# Patient Record
Sex: FEMALE | Race: WHITE | Employment: FULL TIME | URBAN - METROPOLITAN AREA
[De-identification: names, ages, dates, MRNs, and addresses within clinical notes are randomized per-mention and may not be internally consistent; named-entity substitution may affect disease eponyms.]

---

## 2024-02-05 RX ORDER — NADOLOL 20 MG/1
20 TABLET ORAL DAILY
COMMUNITY

## 2024-02-05 RX ORDER — OXYCODONE HYDROCHLORIDE AND ACETAMINOPHEN 5; 325 MG/1; MG/1
1 TABLET ORAL EVERY 4 HOURS PRN
Status: ON HOLD | COMMUNITY
End: 2024-02-22 | Stop reason: HOSPADM

## 2024-02-05 NOTE — PROGRESS NOTES
Follow Up Prior to Surgery    DOS:   :1955      History and Physical:  Pt will see Luciano Mooney on   [phone]    2/15/24 UPDATE: See pre-op H&P/LABS in CareEverywhere

## 2024-02-05 NOTE — PROGRESS NOTES
[facility] Pre-Admission Testing Electronic Communication Worksheet for OR/ENDO Procedures        Patient: Stephanie Whitney    DOS: 2/22    Arrival Time: 7    Surgery Time:830    Meds to Bed:  [x] YES    []  NO    Transportation Confirmed: [x] YES    []  NO    History and Physical:seee epic follow up note    Additional Clearance(Cardiac, Pulmonary, etc):  [] YES    [x]  NO    Pre-Admission Testing Visit:  [] YES    [x]  NO If no, do labs/testing need to be done DOS?  [] YES    [x]  NO    Medication Reconciliation Complete:  [x] YES    []  NO        Additional Notes:                Interview Complete: [x] YES    []  NO          Carolyne Pardo RN  12:53 PM

## 2024-02-05 NOTE — PROGRESS NOTES
[facility] PRE-OPERATIVE INSTRUCTIONS    Day of Procedure:  2/22              Arrival time: 630               Surgery time:830    Take the following medications with a sip of water:  Follow your MD/Surgeons pre-procedure instructions regarding your medications     Do not eat or drink anything after 12:00 midnight prior to your surgery.  This includes water chewing gum, mints and ice chips.   You may brush your teeth and gargle the morning of your surgery, but do not swallow the water     Please see your family doctor/pediatrician for a history and physical and/or concerning medications.   Bring any test results/reports from your physicians office.   If you are under the care of a heart doctor or specialist doctor, please be aware that you may be asked to them for clearance    You may be asked to stop blood thinners such as Coumadin, Plavix, Fragmin, Lovenox, etc., or any anti-inflammatories such as:  Aspirin, Ibuprofen, Advil, Naproxen prior to your surgery.    We also ask that you stop any OTC medications such as fish oil, vitamin E, glucosamine, garlic, Multivitamins, COQ 10, etc.    We ask that you do not smoke 24 hours prior to surgery  We ask that you do not  drink any alcoholic beverages 24 hours prior to surgery     You must make arrangements for a responsible adult to take you home after your surgery.    For your safety you will not be allowed to leave alone or drive yourself home.  Your surgery will be cancelled if you do not have a ride home.     Also for your safety, it is strongly suggested that someone stay with you the first 24 hours after your surgery.     A parent or legal guardian must accompany a child scheduled for surgery and plan to stay at the hospital until the child is discharged.    Please do not bring other children with you.    For your comfort, please wear simple loose fitting clothing to the hospital.  Please do not bring valuables.    Do not wear any make-up or nail polish

## 2024-02-21 ENCOUNTER — PREP FOR PROCEDURE (OUTPATIENT)
Dept: SURGERY | Age: 69
End: 2024-02-21

## 2024-02-21 ENCOUNTER — ANESTHESIA EVENT (OUTPATIENT)
Dept: OPERATING ROOM | Age: 69
End: 2024-02-21
Payer: MEDICARE

## 2024-02-21 RX ORDER — SODIUM CHLORIDE 0.9 % (FLUSH) 0.9 %
5-40 SYRINGE (ML) INJECTION PRN
Status: CANCELLED | OUTPATIENT
Start: 2024-02-21

## 2024-02-21 RX ORDER — SODIUM CHLORIDE 0.9 % (FLUSH) 0.9 %
5-40 SYRINGE (ML) INJECTION EVERY 12 HOURS SCHEDULED
Status: CANCELLED | OUTPATIENT
Start: 2024-02-21

## 2024-02-21 RX ORDER — SODIUM CHLORIDE 9 MG/ML
INJECTION, SOLUTION INTRAVENOUS PRN
Status: CANCELLED | OUTPATIENT
Start: 2024-02-21

## 2024-02-22 ENCOUNTER — APPOINTMENT (OUTPATIENT)
Dept: GENERAL RADIOLOGY | Age: 69
End: 2024-02-22
Payer: MEDICARE

## 2024-02-22 ENCOUNTER — ANESTHESIA (OUTPATIENT)
Dept: OPERATING ROOM | Age: 69
End: 2024-02-22
Payer: MEDICARE

## 2024-02-22 ENCOUNTER — HOSPITAL ENCOUNTER (OUTPATIENT)
Age: 69
Setting detail: OBSERVATION
Discharge: HOME | End: 2024-02-23
Attending: SURGERY | Admitting: SURGERY
Payer: MEDICARE

## 2024-02-22 ENCOUNTER — HOSPITAL ENCOUNTER (OUTPATIENT)
Age: 69
Setting detail: OUTPATIENT SURGERY
Discharge: HOME | End: 2024-02-22
Attending: SURGERY | Admitting: SURGERY
Payer: MEDICARE

## 2024-02-22 VITALS
HEART RATE: 70 BPM | OXYGEN SATURATION: 98 % | HEIGHT: 64 IN | RESPIRATION RATE: 18 BRPM | TEMPERATURE: 97 F | DIASTOLIC BLOOD PRESSURE: 52 MMHG | WEIGHT: 118.72 LBS | BODY MASS INDEX: 20.27 KG/M2 | SYSTOLIC BLOOD PRESSURE: 120 MMHG

## 2024-02-22 DIAGNOSIS — I95.81 HYPOTENSION AFTER PROCEDURE: Primary | ICD-10-CM

## 2024-02-22 DIAGNOSIS — Z85.3 HISTORY OF BREAST CANCER: ICD-10-CM

## 2024-02-22 DIAGNOSIS — Z98.890 POSTOPERATIVE NAUSEA AND VOMITING: ICD-10-CM

## 2024-02-22 DIAGNOSIS — N64.4 MASTODYNIA: ICD-10-CM

## 2024-02-22 DIAGNOSIS — R55 SYNCOPE AND COLLAPSE: ICD-10-CM

## 2024-02-22 DIAGNOSIS — N60.11 FIBROCYSTIC DISEASE OF RIGHT BREAST: ICD-10-CM

## 2024-02-22 DIAGNOSIS — R11.2 POSTOPERATIVE NAUSEA AND VOMITING: ICD-10-CM

## 2024-02-22 PROBLEM — Z90.10: Status: ACTIVE | Noted: 2024-02-22

## 2024-02-22 LAB
ABO + RH BLD: NORMAL
ALBUMIN SERPL-MCNC: 4.2 G/DL (ref 3.4–5)
ALBUMIN SERPL-MCNC: 4.4 G/DL (ref 3.4–5)
ALBUMIN/GLOB SERPL: 1.5 {RATIO} (ref 1.1–2.2)
ALBUMIN/GLOB SERPL: 1.9 {RATIO} (ref 1.1–2.2)
ALP SERPL-CCNC: 55 U/L (ref 40–129)
ALP SERPL-CCNC: 73 U/L (ref 40–129)
ALT SERPL-CCNC: 18 U/L (ref 10–40)
ALT SERPL-CCNC: 22 U/L (ref 10–40)
ANION GAP SERPL CALCULATED.3IONS-SCNC: 6 MMOL/L (ref 3–16)
ANION GAP SERPL CALCULATED.3IONS-SCNC: 9 MMOL/L (ref 3–16)
AST SERPL-CCNC: 29 U/L (ref 15–37)
AST SERPL-CCNC: 33 U/L (ref 15–37)
BACTERIA URNS QL MICRO: ABNORMAL /HPF
BASOPHILS # BLD: 0 K/UL (ref 0–0.2)
BASOPHILS NFR BLD: 0.3 %
BILIRUB SERPL-MCNC: 0.3 MG/DL (ref 0–1)
BILIRUB SERPL-MCNC: 0.4 MG/DL (ref 0–1)
BILIRUB UR QL STRIP.AUTO: NEGATIVE
BLD GP AB SCN SERPL QL: NORMAL
BUN SERPL-MCNC: 17 MG/DL (ref 7–20)
BUN SERPL-MCNC: 18 MG/DL (ref 7–20)
CALCIUM SERPL-MCNC: 8.8 MG/DL (ref 8.3–10.6)
CALCIUM SERPL-MCNC: 9.7 MG/DL (ref 8.3–10.6)
CHLORIDE SERPL-SCNC: 103 MMOL/L (ref 99–110)
CHLORIDE SERPL-SCNC: 105 MMOL/L (ref 99–110)
CLARITY UR: CLEAR
CO2 SERPL-SCNC: 25 MMOL/L (ref 21–32)
CO2 SERPL-SCNC: 29 MMOL/L (ref 21–32)
COLOR UR: YELLOW
CREAT SERPL-MCNC: 0.6 MG/DL (ref 0.6–1.2)
CREAT SERPL-MCNC: <0.5 MG/DL (ref 0.6–1.2)
DEPRECATED RDW RBC AUTO: 13.1 % (ref 12.4–15.4)
DEPRECATED RDW RBC AUTO: 13.4 % (ref 12.4–15.4)
EOSINOPHIL # BLD: 0 K/UL (ref 0–0.6)
EOSINOPHIL NFR BLD: 0 %
EPI CELLS #/AREA URNS AUTO: 1 /HPF (ref 0–5)
GFR SERPLBLD CREATININE-BSD FMLA CKD-EPI: >60 ML/MIN/{1.73_M2}
GFR SERPLBLD CREATININE-BSD FMLA CKD-EPI: >60 ML/MIN/{1.73_M2}
GLUCOSE SERPL-MCNC: 122 MG/DL (ref 70–99)
GLUCOSE SERPL-MCNC: 81 MG/DL (ref 70–99)
GLUCOSE UR STRIP.AUTO-MCNC: NEGATIVE MG/DL
HCT VFR BLD AUTO: 29.3 % (ref 36–48)
HCT VFR BLD AUTO: 36.8 % (ref 36–48)
HGB BLD-MCNC: 10.1 G/DL (ref 12–16)
HGB BLD-MCNC: 12.3 G/DL (ref 12–16)
HGB UR QL STRIP.AUTO: NEGATIVE
HYALINE CASTS #/AREA URNS AUTO: 7 /LPF (ref 0–8)
KETONES UR STRIP.AUTO-MCNC: NEGATIVE MG/DL
LEUKOCYTE ESTERASE UR QL STRIP.AUTO: ABNORMAL
LIPASE SERPL-CCNC: 17 U/L (ref 13–60)
LYMPHOCYTES # BLD: 0.7 K/UL (ref 1–5.1)
LYMPHOCYTES NFR BLD: 6.7 %
MCH RBC QN AUTO: 28.8 PG (ref 26–34)
MCH RBC QN AUTO: 29.7 PG (ref 26–34)
MCHC RBC AUTO-ENTMCNC: 33.4 G/DL (ref 31–36)
MCHC RBC AUTO-ENTMCNC: 34.6 G/DL (ref 31–36)
MCV RBC AUTO: 85.8 FL (ref 80–100)
MCV RBC AUTO: 86.1 FL (ref 80–100)
MONOCYTES # BLD: 1.3 K/UL (ref 0–1.3)
MONOCYTES NFR BLD: 11.7 %
NEUTROPHILS # BLD: 8.8 K/UL (ref 1.7–7.7)
NEUTROPHILS NFR BLD: 81.3 %
NITRITE UR QL STRIP.AUTO: NEGATIVE
PH UR STRIP.AUTO: 6 [PH] (ref 5–8)
PLATELET # BLD AUTO: 225 K/UL (ref 135–450)
PLATELET # BLD AUTO: 284 K/UL (ref 135–450)
PMV BLD AUTO: 6.6 FL (ref 5–10.5)
PMV BLD AUTO: 6.6 FL (ref 5–10.5)
POTASSIUM SERPL-SCNC: 4.2 MMOL/L (ref 3.5–5.1)
POTASSIUM SERPL-SCNC: 4.3 MMOL/L (ref 3.5–5.1)
PROT SERPL-MCNC: 6.4 G/DL (ref 6.4–8.2)
PROT SERPL-MCNC: 7.4 G/DL (ref 6.4–8.2)
PROT UR STRIP.AUTO-MCNC: NEGATIVE MG/DL
RBC # BLD AUTO: 3.42 M/UL (ref 4–5.2)
RBC # BLD AUTO: 4.27 M/UL (ref 4–5.2)
RBC CLUMPS #/AREA URNS AUTO: 0 /HPF (ref 0–4)
SODIUM SERPL-SCNC: 137 MMOL/L (ref 136–145)
SODIUM SERPL-SCNC: 140 MMOL/L (ref 136–145)
SP GR UR STRIP.AUTO: 1.01 (ref 1–1.03)
TROPONIN, HIGH SENSITIVITY: 14 NG/L (ref 0–14)
TROPONIN, HIGH SENSITIVITY: 7 NG/L (ref 0–14)
UA COMPLETE W REFLEX CULTURE PNL UR: YES
UA DIPSTICK W REFLEX MICRO PNL UR: YES
URN SPEC COLLECT METH UR: ABNORMAL
UROBILINOGEN UR STRIP-ACNC: 0.2 E.U./DL
WBC # BLD AUTO: 10.8 K/UL (ref 4–11)
WBC # BLD AUTO: 4.1 K/UL (ref 4–11)
WBC #/AREA URNS AUTO: 11 /HPF (ref 0–5)

## 2024-02-22 PROCEDURE — 2709999900 HC NON-CHARGEABLE SUPPLY: Performed by: SURGERY

## 2024-02-22 PROCEDURE — 85027 COMPLETE CBC AUTOMATED: CPT

## 2024-02-22 PROCEDURE — 2500000003 HC RX 250 WO HCPCS: Performed by: SURGERY

## 2024-02-22 PROCEDURE — 3600000013 HC SURGERY LEVEL 3 ADDTL 15MIN: Performed by: SURGERY

## 2024-02-22 PROCEDURE — G0378 HOSPITAL OBSERVATION PER HR: HCPCS

## 2024-02-22 PROCEDURE — 96361 HYDRATE IV INFUSION ADD-ON: CPT

## 2024-02-22 PROCEDURE — 96365 THER/PROPH/DIAG IV INF INIT: CPT

## 2024-02-22 PROCEDURE — 2500000003 HC RX 250 WO HCPCS

## 2024-02-22 PROCEDURE — 6360000002 HC RX W HCPCS: Performed by: ANESTHESIOLOGY

## 2024-02-22 PROCEDURE — 83690 ASSAY OF LIPASE: CPT

## 2024-02-22 PROCEDURE — 85025 COMPLETE CBC W/AUTO DIFF WBC: CPT

## 2024-02-22 PROCEDURE — 3600000003 HC SURGERY LEVEL 3 BASE: Performed by: SURGERY

## 2024-02-22 PROCEDURE — 86900 BLOOD TYPING SEROLOGIC ABO: CPT

## 2024-02-22 PROCEDURE — 7100000011 HC PHASE II RECOVERY - ADDTL 15 MIN: Performed by: SURGERY

## 2024-02-22 PROCEDURE — 2580000003 HC RX 258: Performed by: NURSE PRACTITIONER

## 2024-02-22 PROCEDURE — 6360000002 HC RX W HCPCS

## 2024-02-22 PROCEDURE — 93005 ELECTROCARDIOGRAM TRACING: CPT | Performed by: NURSE PRACTITIONER

## 2024-02-22 PROCEDURE — 6370000000 HC RX 637 (ALT 250 FOR IP): Performed by: ANESTHESIOLOGY

## 2024-02-22 PROCEDURE — 81001 URINALYSIS AUTO W/SCOPE: CPT

## 2024-02-22 PROCEDURE — A4217 STERILE WATER/SALINE, 500 ML: HCPCS | Performed by: SURGERY

## 2024-02-22 PROCEDURE — P9047 ALBUMIN (HUMAN), 25%, 50ML: HCPCS | Performed by: NURSE PRACTITIONER

## 2024-02-22 PROCEDURE — 36415 COLL VENOUS BLD VENIPUNCTURE: CPT

## 2024-02-22 PROCEDURE — 2580000003 HC RX 258: Performed by: SURGERY

## 2024-02-22 PROCEDURE — 71045 X-RAY EXAM CHEST 1 VIEW: CPT

## 2024-02-22 PROCEDURE — 96375 TX/PRO/DX INJ NEW DRUG ADDON: CPT

## 2024-02-22 PROCEDURE — 7100000001 HC PACU RECOVERY - ADDTL 15 MIN: Performed by: SURGERY

## 2024-02-22 PROCEDURE — 7100000010 HC PHASE II RECOVERY - FIRST 15 MIN: Performed by: SURGERY

## 2024-02-22 PROCEDURE — 87086 URINE CULTURE/COLONY COUNT: CPT

## 2024-02-22 PROCEDURE — 7100000000 HC PACU RECOVERY - FIRST 15 MIN: Performed by: SURGERY

## 2024-02-22 PROCEDURE — 3700000000 HC ANESTHESIA ATTENDED CARE: Performed by: SURGERY

## 2024-02-22 PROCEDURE — 80053 COMPREHEN METABOLIC PANEL: CPT

## 2024-02-22 PROCEDURE — 6360000002 HC RX W HCPCS: Performed by: SURGERY

## 2024-02-22 PROCEDURE — 88307 TISSUE EXAM BY PATHOLOGIST: CPT

## 2024-02-22 PROCEDURE — 99285 EMERGENCY DEPT VISIT HI MDM: CPT

## 2024-02-22 PROCEDURE — 86901 BLOOD TYPING SEROLOGIC RH(D): CPT

## 2024-02-22 PROCEDURE — 3700000001 HC ADD 15 MINUTES (ANESTHESIA): Performed by: SURGERY

## 2024-02-22 PROCEDURE — 86850 RBC ANTIBODY SCREEN: CPT

## 2024-02-22 PROCEDURE — 2580000003 HC RX 258: Performed by: STUDENT IN AN ORGANIZED HEALTH CARE EDUCATION/TRAINING PROGRAM

## 2024-02-22 PROCEDURE — 6370000000 HC RX 637 (ALT 250 FOR IP): Performed by: SURGERY

## 2024-02-22 PROCEDURE — 84484 ASSAY OF TROPONIN QUANT: CPT

## 2024-02-22 PROCEDURE — 6360000002 HC RX W HCPCS: Performed by: NURSE PRACTITIONER

## 2024-02-22 RX ORDER — SODIUM CHLORIDE 0.9 % (FLUSH) 0.9 %
5-40 SYRINGE (ML) INJECTION EVERY 12 HOURS SCHEDULED
Status: DISCONTINUED | OUTPATIENT
Start: 2024-02-22 | End: 2024-02-22 | Stop reason: HOSPADM

## 2024-02-22 RX ORDER — MAGNESIUM HYDROXIDE 1200 MG/15ML
LIQUID ORAL CONTINUOUS PRN
Status: DISCONTINUED | OUTPATIENT
Start: 2024-02-22 | End: 2024-02-22 | Stop reason: HOSPADM

## 2024-02-22 RX ORDER — PROPOFOL 10 MG/ML
INJECTION, EMULSION INTRAVENOUS PRN
Status: DISCONTINUED | OUTPATIENT
Start: 2024-02-22 | End: 2024-02-22 | Stop reason: SDUPTHER

## 2024-02-22 RX ORDER — ALBUMIN (HUMAN) 12.5 G/50ML
25 SOLUTION INTRAVENOUS ONCE
Status: COMPLETED | OUTPATIENT
Start: 2024-02-22 | End: 2024-02-22

## 2024-02-22 RX ORDER — SODIUM CHLORIDE, SODIUM LACTATE, POTASSIUM CHLORIDE, AND CALCIUM CHLORIDE .6; .31; .03; .02 G/100ML; G/100ML; G/100ML; G/100ML
1000 INJECTION, SOLUTION INTRAVENOUS ONCE
Status: COMPLETED | OUTPATIENT
Start: 2024-02-22 | End: 2024-02-22

## 2024-02-22 RX ORDER — FENTANYL CITRATE 0.05 MG/ML
25 INJECTION, SOLUTION INTRAMUSCULAR; INTRAVENOUS EVERY 5 MIN PRN
Status: DISCONTINUED | OUTPATIENT
Start: 2024-02-22 | End: 2024-02-22 | Stop reason: HOSPADM

## 2024-02-22 RX ORDER — FENTANYL CITRATE 50 UG/ML
INJECTION, SOLUTION INTRAMUSCULAR; INTRAVENOUS PRN
Status: DISCONTINUED | OUTPATIENT
Start: 2024-02-22 | End: 2024-02-22 | Stop reason: SDUPTHER

## 2024-02-22 RX ORDER — ONDANSETRON 2 MG/ML
4 INJECTION INTRAMUSCULAR; INTRAVENOUS EVERY 6 HOURS PRN
Status: DISCONTINUED | OUTPATIENT
Start: 2024-02-22 | End: 2024-02-23 | Stop reason: HOSPADM

## 2024-02-22 RX ORDER — SODIUM CHLORIDE 0.9 % (FLUSH) 0.9 %
5-40 SYRINGE (ML) INJECTION PRN
Status: DISCONTINUED | OUTPATIENT
Start: 2024-02-22 | End: 2024-02-23 | Stop reason: HOSPADM

## 2024-02-22 RX ORDER — SODIUM CHLORIDE 9 MG/ML
INJECTION, SOLUTION INTRAVENOUS PRN
Status: DISCONTINUED | OUTPATIENT
Start: 2024-02-22 | End: 2024-02-23 | Stop reason: HOSPADM

## 2024-02-22 RX ORDER — SODIUM CHLORIDE 0.9 % (FLUSH) 0.9 %
5-40 SYRINGE (ML) INJECTION PRN
Status: DISCONTINUED | OUTPATIENT
Start: 2024-02-22 | End: 2024-02-22 | Stop reason: HOSPADM

## 2024-02-22 RX ORDER — CEPHALEXIN 500 MG/1
500 CAPSULE ORAL 2 TIMES DAILY
Qty: 14 CAPSULE | Refills: 0 | Status: SHIPPED | OUTPATIENT
Start: 2024-02-22 | End: 2024-02-29

## 2024-02-22 RX ORDER — SODIUM CHLORIDE 0.9 % (FLUSH) 0.9 %
5-40 SYRINGE (ML) INJECTION EVERY 12 HOURS SCHEDULED
Status: DISCONTINUED | OUTPATIENT
Start: 2024-02-22 | End: 2024-02-22 | Stop reason: SDUPTHER

## 2024-02-22 RX ORDER — SODIUM CHLORIDE 0.9 % (FLUSH) 0.9 %
5-40 SYRINGE (ML) INJECTION EVERY 12 HOURS SCHEDULED
Status: DISCONTINUED | OUTPATIENT
Start: 2024-02-22 | End: 2024-02-23 | Stop reason: HOSPADM

## 2024-02-22 RX ORDER — DEXAMETHASONE SODIUM PHOSPHATE 4 MG/ML
INJECTION, SOLUTION INTRA-ARTICULAR; INTRALESIONAL; INTRAMUSCULAR; INTRAVENOUS; SOFT TISSUE PRN
Status: DISCONTINUED | OUTPATIENT
Start: 2024-02-22 | End: 2024-02-22 | Stop reason: SDUPTHER

## 2024-02-22 RX ORDER — METOCLOPRAMIDE HYDROCHLORIDE 5 MG/ML
10 INJECTION INTRAMUSCULAR; INTRAVENOUS ONCE
Status: COMPLETED | OUTPATIENT
Start: 2024-02-22 | End: 2024-02-22

## 2024-02-22 RX ORDER — ONDANSETRON 2 MG/ML
INJECTION INTRAMUSCULAR; INTRAVENOUS PRN
Status: DISCONTINUED | OUTPATIENT
Start: 2024-02-22 | End: 2024-02-22 | Stop reason: SDUPTHER

## 2024-02-22 RX ORDER — LIDOCAINE HYDROCHLORIDE 20 MG/ML
INJECTION, SOLUTION EPIDURAL; INFILTRATION; INTRACAUDAL; PERINEURAL PRN
Status: DISCONTINUED | OUTPATIENT
Start: 2024-02-22 | End: 2024-02-22 | Stop reason: SDUPTHER

## 2024-02-22 RX ORDER — HYDROCODONE BITARTRATE AND ACETAMINOPHEN 5; 325 MG/1; MG/1
1 TABLET ORAL EVERY 4 HOURS PRN
Qty: 18 TABLET | Refills: 0 | Status: SHIPPED | OUTPATIENT
Start: 2024-02-22 | End: 2024-02-25

## 2024-02-22 RX ORDER — MORPHINE SULFATE 2 MG/ML
2 INJECTION, SOLUTION INTRAMUSCULAR; INTRAVENOUS EVERY 4 HOURS PRN
Status: DISCONTINUED | OUTPATIENT
Start: 2024-02-22 | End: 2024-02-23 | Stop reason: HOSPADM

## 2024-02-22 RX ORDER — CLOTRIMAZOLE AND BETAMETHASONE DIPROPIONATE 10; .64 MG/G; MG/G
CREAM TOPICAL 2 TIMES DAILY
COMMUNITY

## 2024-02-22 RX ORDER — LIDOCAINE HYDROCHLORIDE 10 MG/ML
INJECTION, SOLUTION INFILTRATION; PERINEURAL
Status: COMPLETED | OUTPATIENT
Start: 2024-02-22 | End: 2024-02-22

## 2024-02-22 RX ORDER — SODIUM CHLORIDE 9 MG/ML
INJECTION, SOLUTION INTRAVENOUS PRN
Status: DISCONTINUED | OUTPATIENT
Start: 2024-02-22 | End: 2024-02-22 | Stop reason: SDUPTHER

## 2024-02-22 RX ORDER — EPHEDRINE SULFATE/0.9% NACL/PF 50 MG/5 ML
SYRINGE (ML) INTRAVENOUS PRN
Status: DISCONTINUED | OUTPATIENT
Start: 2024-02-22 | End: 2024-02-22 | Stop reason: SDUPTHER

## 2024-02-22 RX ORDER — HYDROCODONE BITARTRATE AND ACETAMINOPHEN 5; 325 MG/1; MG/1
2 TABLET ORAL EVERY 4 HOURS PRN
Status: DISCONTINUED | OUTPATIENT
Start: 2024-02-22 | End: 2024-02-23 | Stop reason: HOSPADM

## 2024-02-22 RX ORDER — ONDANSETRON 4 MG/1
4 TABLET, FILM COATED ORAL EVERY 8 HOURS PRN
Status: ON HOLD | COMMUNITY
End: 2024-02-23 | Stop reason: HOSPADM

## 2024-02-22 RX ORDER — ACETAMINOPHEN 325 MG/1
650 TABLET ORAL EVERY 6 HOURS PRN
Status: DISCONTINUED | OUTPATIENT
Start: 2024-02-22 | End: 2024-02-23 | Stop reason: HOSPADM

## 2024-02-22 RX ORDER — SODIUM CHLORIDE 9 MG/ML
INJECTION, SOLUTION INTRAVENOUS PRN
Status: DISCONTINUED | OUTPATIENT
Start: 2024-02-22 | End: 2024-02-22 | Stop reason: HOSPADM

## 2024-02-22 RX ORDER — DIPHENHYDRAMINE HYDROCHLORIDE 50 MG/ML
12.5 INJECTION INTRAMUSCULAR; INTRAVENOUS ONCE
Status: DISCONTINUED | OUTPATIENT
Start: 2024-02-22 | End: 2024-02-23 | Stop reason: HOSPADM

## 2024-02-22 RX ORDER — ONDANSETRON 4 MG/1
4 TABLET, ORALLY DISINTEGRATING ORAL EVERY 8 HOURS PRN
Status: DISCONTINUED | OUTPATIENT
Start: 2024-02-22 | End: 2024-02-23 | Stop reason: HOSPADM

## 2024-02-22 RX ORDER — ONDANSETRON 4 MG/1
4 TABLET, FILM COATED ORAL EVERY 8 HOURS PRN
Qty: 3 TABLET | Refills: 1 | Status: SHIPPED | OUTPATIENT
Start: 2024-02-22 | End: 2024-02-22

## 2024-02-22 RX ORDER — OXYCODONE HYDROCHLORIDE AND ACETAMINOPHEN 5; 325 MG/1; MG/1
1 TABLET ORAL EVERY 4 HOURS PRN
Status: ON HOLD | COMMUNITY
End: 2024-02-23 | Stop reason: HOSPADM

## 2024-02-22 RX ORDER — SODIUM CHLORIDE, SODIUM LACTATE, POTASSIUM CHLORIDE, CALCIUM CHLORIDE 600; 310; 30; 20 MG/100ML; MG/100ML; MG/100ML; MG/100ML
INJECTION, SOLUTION INTRAVENOUS CONTINUOUS
Status: DISCONTINUED | OUTPATIENT
Start: 2024-02-22 | End: 2024-02-23 | Stop reason: HOSPADM

## 2024-02-22 RX ORDER — HYDROCODONE BITARTRATE AND ACETAMINOPHEN 5; 325 MG/1; MG/1
1 TABLET ORAL EVERY 4 HOURS PRN
Status: DISCONTINUED | OUTPATIENT
Start: 2024-02-22 | End: 2024-02-23 | Stop reason: HOSPADM

## 2024-02-22 RX ORDER — MEPERIDINE HYDROCHLORIDE 25 MG/ML
12.5 INJECTION INTRAMUSCULAR; INTRAVENOUS; SUBCUTANEOUS
Status: COMPLETED | OUTPATIENT
Start: 2024-02-22 | End: 2024-02-22

## 2024-02-22 RX ORDER — GLYCOPYRROLATE 0.2 MG/ML
INJECTION INTRAMUSCULAR; INTRAVENOUS PRN
Status: DISCONTINUED | OUTPATIENT
Start: 2024-02-22 | End: 2024-02-22 | Stop reason: SDUPTHER

## 2024-02-22 RX ORDER — LANOLIN ALCOHOL/MO/W.PET/CERES
20 CREAM (GRAM) TOPICAL NIGHTLY
COMMUNITY

## 2024-02-22 RX ORDER — HYDROCODONE BITARTRATE AND ACETAMINOPHEN 5; 325 MG/1; MG/1
1 TABLET ORAL
Status: COMPLETED | OUTPATIENT
Start: 2024-02-22 | End: 2024-02-22

## 2024-02-22 RX ORDER — OMEPRAZOLE 10 MG/1
20 CAPSULE, DELAYED RELEASE ORAL DAILY PRN
COMMUNITY

## 2024-02-22 RX ORDER — SODIUM CHLORIDE, SODIUM LACTATE, POTASSIUM CHLORIDE, AND CALCIUM CHLORIDE .6; .31; .03; .02 G/100ML; G/100ML; G/100ML; G/100ML
1000 INJECTION, SOLUTION INTRAVENOUS ONCE
Status: DISCONTINUED | OUTPATIENT
Start: 2024-02-22 | End: 2024-02-22 | Stop reason: SDUPTHER

## 2024-02-22 RX ORDER — 0.9 % SODIUM CHLORIDE 0.9 %
1000 INTRAVENOUS SOLUTION INTRAVENOUS ONCE
Status: COMPLETED | OUTPATIENT
Start: 2024-02-22 | End: 2024-02-22

## 2024-02-22 RX ORDER — FENTANYL CITRATE 0.05 MG/ML
50 INJECTION, SOLUTION INTRAMUSCULAR; INTRAVENOUS EVERY 5 MIN PRN
Status: DISCONTINUED | OUTPATIENT
Start: 2024-02-22 | End: 2024-02-22 | Stop reason: HOSPADM

## 2024-02-22 RX ORDER — SODIUM CHLORIDE 0.9 % (FLUSH) 0.9 %
5-40 SYRINGE (ML) INJECTION PRN
Status: DISCONTINUED | OUTPATIENT
Start: 2024-02-22 | End: 2024-02-22 | Stop reason: SDUPTHER

## 2024-02-22 RX ORDER — ONDANSETRON 2 MG/ML
4 INJECTION INTRAMUSCULAR; INTRAVENOUS
Status: DISCONTINUED | OUTPATIENT
Start: 2024-02-22 | End: 2024-02-22 | Stop reason: HOSPADM

## 2024-02-22 RX ADMIN — FENTANYL CITRATE 25 MCG: 50 INJECTION INTRAMUSCULAR; INTRAVENOUS at 08:32

## 2024-02-22 RX ADMIN — ONDANSETRON 4 MG: 2 INJECTION INTRAMUSCULAR; INTRAVENOUS at 09:41

## 2024-02-22 RX ADMIN — PROPOFOL 50 MG: 10 INJECTION, EMULSION INTRAVENOUS at 08:40

## 2024-02-22 RX ADMIN — LIDOCAINE HYDROCHLORIDE 50 MG: 20 INJECTION, SOLUTION EPIDURAL; INFILTRATION; INTRACAUDAL; PERINEURAL at 08:39

## 2024-02-22 RX ADMIN — SODIUM CHLORIDE 2000 MG: 900 INJECTION INTRAVENOUS at 08:39

## 2024-02-22 RX ADMIN — FENTANYL CITRATE 25 MCG: 0.05 INJECTION, SOLUTION INTRAMUSCULAR; INTRAVENOUS at 10:29

## 2024-02-22 RX ADMIN — ALBUMIN (HUMAN) 25 G: 0.25 INJECTION, SOLUTION INTRAVENOUS at 19:07

## 2024-02-22 RX ADMIN — MEPERIDINE HYDROCHLORIDE 12.5 MG: 25 INJECTION, SOLUTION INTRAMUSCULAR; INTRAVENOUS; SUBCUTANEOUS at 10:43

## 2024-02-22 RX ADMIN — FENTANYL CITRATE 25 MCG: 0.05 INJECTION, SOLUTION INTRAMUSCULAR; INTRAVENOUS at 10:37

## 2024-02-22 RX ADMIN — PROPOFOL 150 MG: 10 INJECTION, EMULSION INTRAVENOUS at 08:39

## 2024-02-22 RX ADMIN — FENTANYL CITRATE 25 MCG: 50 INJECTION INTRAMUSCULAR; INTRAVENOUS at 09:06

## 2024-02-22 RX ADMIN — FENTANYL CITRATE 25 MCG: 50 INJECTION INTRAMUSCULAR; INTRAVENOUS at 09:04

## 2024-02-22 RX ADMIN — SODIUM CHLORIDE, POTASSIUM CHLORIDE, SODIUM LACTATE AND CALCIUM CHLORIDE: 600; 310; 30; 20 INJECTION, SOLUTION INTRAVENOUS at 22:00

## 2024-02-22 RX ADMIN — FENTANYL CITRATE 25 MCG: 50 INJECTION INTRAMUSCULAR; INTRAVENOUS at 08:58

## 2024-02-22 RX ADMIN — Medication 10 MG: at 09:08

## 2024-02-22 RX ADMIN — ACETAMINOPHEN 650 MG: 325 TABLET ORAL at 22:41

## 2024-02-22 RX ADMIN — DEXAMETHASONE SODIUM PHOSPHATE 8 MG: 4 INJECTION, SOLUTION INTRAMUSCULAR; INTRAVENOUS at 08:41

## 2024-02-22 RX ADMIN — SODIUM CHLORIDE 1000 ML: 9 INJECTION, SOLUTION INTRAVENOUS at 18:05

## 2024-02-22 RX ADMIN — SODIUM CHLORIDE, POTASSIUM CHLORIDE, SODIUM LACTATE AND CALCIUM CHLORIDE 1000 ML: 600; 310; 30; 20 INJECTION, SOLUTION INTRAVENOUS at 19:00

## 2024-02-22 RX ADMIN — SODIUM CHLORIDE 2000 MG: 900 INJECTION INTRAVENOUS at 22:40

## 2024-02-22 RX ADMIN — METOCLOPRAMIDE 10 MG: 5 INJECTION, SOLUTION INTRAMUSCULAR; INTRAVENOUS at 18:12

## 2024-02-22 RX ADMIN — SODIUM CHLORIDE: 9 INJECTION, SOLUTION INTRAVENOUS at 08:32

## 2024-02-22 RX ADMIN — HYDROCODONE BITARTRATE AND ACETAMINOPHEN 1 TABLET: 5; 325 TABLET ORAL at 11:27

## 2024-02-22 RX ADMIN — GLYCOPYRROLATE 0.2 MG: 0.2 INJECTION INTRAMUSCULAR; INTRAVENOUS at 08:48

## 2024-02-22 ASSESSMENT — PAIN DESCRIPTION - FREQUENCY
FREQUENCY: CONTINUOUS
FREQUENCY: CONTINUOUS

## 2024-02-22 ASSESSMENT — PAIN - FUNCTIONAL ASSESSMENT
PAIN_FUNCTIONAL_ASSESSMENT: 0-10
PAIN_FUNCTIONAL_ASSESSMENT: NONE - DENIES PAIN
PAIN_FUNCTIONAL_ASSESSMENT: 0-10
PAIN_FUNCTIONAL_ASSESSMENT: NONE - DENIES PAIN
PAIN_FUNCTIONAL_ASSESSMENT: 0-10
PAIN_FUNCTIONAL_ASSESSMENT: ACTIVITIES ARE NOT PREVENTED
PAIN_FUNCTIONAL_ASSESSMENT: ACTIVITIES ARE NOT PREVENTED

## 2024-02-22 ASSESSMENT — PAIN SCALES - GENERAL
PAINLEVEL_OUTOF10: 1
PAINLEVEL_OUTOF10: 4

## 2024-02-22 ASSESSMENT — PAIN DESCRIPTION - ORIENTATION
ORIENTATION: RIGHT;LEFT
ORIENTATION: RIGHT;LEFT

## 2024-02-22 ASSESSMENT — LIFESTYLE VARIABLES
HOW OFTEN DO YOU HAVE A DRINK CONTAINING ALCOHOL: NEVER
SMOKING_STATUS: 0

## 2024-02-22 ASSESSMENT — PAIN DESCRIPTION - DESCRIPTORS
DESCRIPTORS: DULL;DISCOMFORT
DESCRIPTORS: ACHING;DISCOMFORT
DESCRIPTORS: ACHING;DISCOMFORT
DESCRIPTORS: DULL;DISCOMFORT

## 2024-02-22 ASSESSMENT — PAIN SCALES - WONG BAKER: WONGBAKER_NUMERICALRESPONSE: 0

## 2024-02-22 ASSESSMENT — ENCOUNTER SYMPTOMS
NAUSEA: 1
VOMITING: 1

## 2024-02-22 ASSESSMENT — PAIN DESCRIPTION - PAIN TYPE
TYPE: SURGICAL PAIN
TYPE: SURGICAL PAIN

## 2024-02-22 ASSESSMENT — PAIN DESCRIPTION - LOCATION
LOCATION: BREAST
LOCATION: BREAST

## 2024-02-22 NOTE — PROGRESS NOTES
Tolerating oral intake.  Medicated for pain.  Discharge instructions, including SALOME drain care and emptying, given to patient and parents.  Verbalize understanding.  Supplies given.

## 2024-02-22 NOTE — ANESTHESIA PRE PROCEDURE
[facility] Department of Anesthesiology  Pre-Anesthesia Evaluation/Consultation       Name:  Stephanie Whitney  : 1955  Age:  68 y.o.                                           MRN:  [id number]  Date: 2024           Surgeon: Surgeon(s):  Adamaris Sosa MD    Procedure: Procedure(s):  RIGHT BREAST PROPHYLACTIC MASTECTOMY; REMOVAL LEFT BREAST IMPLANT     No Known Allergies  Patient Active Problem List   Diagnosis    Dog bite    Hypothyroidism     Past Medical History:   Diagnosis Date    Cancer (HCC) 2000    breast cancer    Seasonal allergies     SVT (supraventricular tachycardia)      Past Surgical History:   Procedure Laterality Date    BILATERAL SALPINGOOPHORECTOMY      MASTECTOMY Left 2000     Social History     Tobacco Use    Smoking status: Never    Smokeless tobacco: Never   Vaping Use    Vaping Use: Never used   Substance Use Topics    Alcohol use: No    Drug use: No     Medications  No current facility-administered medications on file prior to encounter.     Current Outpatient Medications on File Prior to Encounter   Medication Sig Dispense Refill    oxyCODONE-acetaminophen (PERCOCET) 5-325 MG per tablet Take 1 tablet by mouth every 4 hours as needed for Pain.      nadolol (CORGARD) 20 MG tablet Take 1 tablet by mouth daily      acetaminophen 650 MG TABS Take 650 mg by mouth every 4 hours as needed. 120 tablet 3    magnesium oxide (MAG-OX) 400 (241.3 MG) MG TABS tablet Take 1 tablet by mouth daily      PARoxetine (PAXIL) 20 MG tablet Take 1 tablet by mouth every morning       Current Facility-Administered Medications   Medication Dose Route Frequency Provider Last Rate Last Admin    sodium chloride flush 0.9 % injection 5-40 mL  5-40 mL IntraVENous 2 times per day Jim Miller MD        sodium chloride flush 0.9 % injection 5-40 mL  5-40 mL IntraVENous PRN Jim Miller MD        0.9 % sodium chloride infusion   IntraVENous PRN Jim Miller MD        ceFAZolin (ANCEF)

## 2024-02-22 NOTE — PROGRESS NOTES
Received from PACU. Admitted to Phase 2 care.  Awake, respirations easy and even.  Oriented to room and surroundings.  Continues with complaints of pain.

## 2024-02-22 NOTE — PROGRESS NOTES
Pt awake, alert, and oriented. Room air. Vss. Pt stable. No longer shivering. Pain level satisfied. Ready for phase II.

## 2024-02-22 NOTE — DISCHARGE INSTRUCTIONS
1. Remove dressings 2/24/2024, leave steristrips in place  2. Ice pack to bilateral surgical site  3. Record drain output 2-3 times daily, Strip drain when emptying  4. Do not rinse bulb or tubing!  5. No driving x few days or while taking pain meds  6. No lifting > 10 lbs with right arm while drain in place  7. Call office on 2/28/2024 to report drain output, follow up will be scheduled at that time.  [phone]    8.  May ambulate, may climb stairs  9.  May shower beginning to 2/24/2024  10.  May cover drain site with gauze and tape as needed  11.  Call office if problems or questions [phone]

## 2024-02-22 NOTE — H&P
Date of Surgery Update:  Stephanie Whitney was seen, history and physical examination reviewed, and patient examined by me today. There have been no significant clinical changes since the completion of the previous history and physical. The surgical site was confirmed by the patient and me.     I have presented reasonable alternatives to the patient's proposed care, treatment, and services. The discussion I have done encompassed risks, benefits, and side effects related to the alternatives and the risks related to not receiving the proposed care, treatment, and services.     All questions answered. Patient wishes to proceed.     Electronically signed by: Adamaris Sosa MD,2/22/2024,8:18 AM

## 2024-02-22 NOTE — PROGRESS NOTES
Continues with complaints of pain, but states she is ready to go home.  Tolerating being up on side of bed.  Stable for discharge.

## 2024-02-22 NOTE — PROGRESS NOTES
PT awake, alert, and oriented. C/o shivering. Pt given 12.5mg IVP Demerol per prn order. Will continue to monitor.

## 2024-02-22 NOTE — BRIEF OP NOTE
Brief Postoperative Note    Name           : Stephanie Whitney  YOB: 1955  MRN             : [id number]    Pre-operative Diagnosis: 1.  History of left breast cancer  2.  Severe fibrocystic disease right breast  3.  Left breast reconstruction with silicone implant  4.  Right breast augmentation with silicone implant  5.  Ruptured bilateral breast implants    Post-operative Diagnosis: Same    Procedure: 1.  Prophylactic simple right mastectomy with removal of ruptured breast implant  2.  Removal ruptured left silicone implant  3.  Closure of left mastectomy site    Anesthesia: General and Local    Surgeons/Assistants: Sheila    Estimated Blood Loss: less than 50     Complications: None    Specimens: Was Obtained: 1.  Right breast with implant  2.  Left breast implant with the skin and subcutaneous tissue    Findings: Same as postop    Drains: 10 mm Jasper x 1 right    Electronically signed by Adamaris Sosa MD on 2/22/2024 at 9:58 AM

## 2024-02-22 NOTE — ED PROVIDER NOTES
provider specified.    DISCHARGE MEDICATIONS:  New Prescriptions    No medications on file       (Please note that portions of this note were completed with a voice recognition program.  Efforts were made to edit the dictations but occasionally words are mis-transcribed.)    VIC Mota CNP     This dictation was performed with a verbal recognition program (DRAGON) and it was checked for errors. It is possible that there are still dictated errors within this office note. If so, please bring any errors to my attention for an addendum. All efforts were made to ensure that this office note is accurate.       Amber Suarez APRN - CNP  02/22/24 1835       Amber Suarez APRN - CNP  02/22/24 1836       Amber Suarez APRN - CNP  02/22/24 [id number]

## 2024-02-22 NOTE — ANESTHESIA POSTPROCEDURE EVALUATION
Department of Anesthesiology  Postprocedure Note    Patient: Stephanie Whitney  MRN: [id number]  YOB: 1955  Date of evaluation: 2/22/2024    Procedure Summary       Date: 02/22/24 Room / Location: [address]    Anesthesia Start: 0832 Anesthesia Stop: 1020    Procedure: RIGHT BREAST PROPHYLACTIC MASTECTOMY; REMOVAL LEFT BREAST IMPLANT (Bilateral: Breast) Diagnosis:       History of breast cancer      Fibrocystic disease of right breast      Mastodynia      (History of breast cancer [Z85.3])      (Fibrocystic disease of right breast [N60.11])      (Mastodynia [N64.4])    Surgeons: Adamaris Sosa MD Responsible Provider: Constantine Wolf MD    Anesthesia Type: general ASA Status: 3            Anesthesia Type: No value filed.    Westley Phase I: Westley Score: 10    Westley Phase II: Westley Score: 9    Anesthesia Post Evaluation    Patient location during evaluation: PACU  Patient participation: complete - patient participated  Level of consciousness: awake and alert  Pain score: 2  Airway patency: patent  Nausea & Vomiting: no nausea and no vomiting  Cardiovascular status: blood pressure returned to baseline  Respiratory status: acceptable  Hydration status: euvolemic  Pain management: adequate    No notable events documented.

## 2024-02-22 NOTE — PROGRESS NOTES
Pt received into bay 5 from OR. Pt drowsy, yet arousable. Vss. Pt stable. Report obtained. Silver breast dressings c/d/I. SALOME drain intact, in place. Bloody drainage noted in SALOME drain (Right). Denies pain. Ice pack applied to sites. Will monitor.

## 2024-02-23 VITALS
WEIGHT: 123.02 LBS | OXYGEN SATURATION: 95 % | HEIGHT: 64 IN | HEART RATE: 95 BPM | TEMPERATURE: 99.3 F | RESPIRATION RATE: 20 BRPM | SYSTOLIC BLOOD PRESSURE: 100 MMHG | DIASTOLIC BLOOD PRESSURE: 57 MMHG | BODY MASS INDEX: 21 KG/M2

## 2024-02-23 LAB
BACTERIA UR CULT: NORMAL
EKG ATRIAL RATE: 58 BPM
EKG DIAGNOSIS: NORMAL
EKG P AXIS: 72 DEGREES
EKG P-R INTERVAL: 152 MS
EKG Q-T INTERVAL: 452 MS
EKG QRS DURATION: 76 MS
EKG QTC CALCULATION (BAZETT): 443 MS
EKG R AXIS: 50 DEGREES
EKG T AXIS: 21 DEGREES
EKG VENTRICULAR RATE: 58 BPM
GLUCOSE BLD-MCNC: 110 MG/DL (ref 70–99)
PERFORMED ON: ABNORMAL

## 2024-02-23 PROCEDURE — 6370000000 HC RX 637 (ALT 250 FOR IP): Performed by: SURGERY

## 2024-02-23 PROCEDURE — 93010 ELECTROCARDIOGRAM REPORT: CPT | Performed by: INTERNAL MEDICINE

## 2024-02-23 PROCEDURE — 94760 N-INVAS EAR/PLS OXIMETRY 1: CPT

## 2024-02-23 PROCEDURE — G0378 HOSPITAL OBSERVATION PER HR: HCPCS

## 2024-02-23 PROCEDURE — 96361 HYDRATE IV INFUSION ADD-ON: CPT

## 2024-02-23 PROCEDURE — 2580000003 HC RX 258: Performed by: SURGERY

## 2024-02-23 PROCEDURE — 6360000002 HC RX W HCPCS: Performed by: SURGERY

## 2024-02-23 RX ORDER — 0.9 % SODIUM CHLORIDE 0.9 %
500 INTRAVENOUS SOLUTION INTRAVENOUS ONCE
Status: COMPLETED | OUTPATIENT
Start: 2024-02-23 | End: 2024-02-23

## 2024-02-23 RX ADMIN — SODIUM CHLORIDE 500 ML: 9 INJECTION, SOLUTION INTRAVENOUS at 06:25

## 2024-02-23 RX ADMIN — ACETAMINOPHEN 650 MG: 325 TABLET ORAL at 06:07

## 2024-02-23 RX ADMIN — SODIUM CHLORIDE 2000 MG: 900 INJECTION INTRAVENOUS at 05:52

## 2024-02-23 ASSESSMENT — PAIN SCALES - GENERAL
PAINLEVEL_OUTOF10: 0
PAINLEVEL_OUTOF10: 1
PAINLEVEL_OUTOF10: 2

## 2024-02-23 ASSESSMENT — PAIN - FUNCTIONAL ASSESSMENT: PAIN_FUNCTIONAL_ASSESSMENT: ACTIVITIES ARE NOT PREVENTED

## 2024-02-23 ASSESSMENT — PAIN DESCRIPTION - ONSET: ONSET: ON-GOING

## 2024-02-23 ASSESSMENT — PAIN DESCRIPTION - DESCRIPTORS: DESCRIPTORS: ACHING

## 2024-02-23 ASSESSMENT — PAIN DESCRIPTION - LOCATION: LOCATION: CHEST

## 2024-02-23 ASSESSMENT — PAIN DESCRIPTION - PAIN TYPE: TYPE: SURGICAL PAIN

## 2024-02-23 ASSESSMENT — PAIN SCALES - WONG BAKER: WONGBAKER_NUMERICALRESPONSE: 0

## 2024-02-23 ASSESSMENT — PAIN DESCRIPTION - FREQUENCY: FREQUENCY: CONTINUOUS

## 2024-02-23 ASSESSMENT — PAIN DESCRIPTION - ORIENTATION: ORIENTATION: RIGHT;LEFT

## 2024-02-23 NOTE — ED NOTES
Patient presents to the ED via walk in for syncope at home.  Patient has breast implant removal and right sided masectomy earlier today per MD Sosa, and discharged home.  At home patient became dizzy, nauseous, and had witnessed syncopal episode.  Patient went back to see MD Sosa and referred to ED.  Patient denies dizziness currently, states it was when she stood up and moved around.  Patient alert and oriented, respirations even and easy.      Patient to room 7 on arrival, placed into gown, continuous telemetry and pulse oximetry. Patient noted to be hypotensive, NP SOURAV Jordan made aware and at bedside.  Drainage container noted to right side of chest, 50ml bloody output emptied per SOURAV Jordan NP.

## 2024-02-23 NOTE — PROGRESS NOTES
Pt arrived to floor from ER at 2158  via stretcher to RM 3125. Pt oriented to room, call light, policies and procedures, the menu and ordering. Call light within reach. Bed in lowest position, bed alarm on, and wheels locked. Pt verbalized understanding. No complaints, questions or concerns at this time.

## 2024-02-23 NOTE — PROGRESS NOTES
Medication Reconciliation    List of medications patient is currently taking is complete.     Source of information: 1. Conversation with patient at bedside                                      2. EPIC records      Allergies  Patient has no known allergies.     Notes regarding home medications:   1. Patient did not receive all of her home medications prior to arrival to the emergency department. She only received both doses of antibiotic along with a dose of Zofran for nausea.    2. Patient is on Keflex 500mg capsule taking PO BID, both doses taken today. Therapy started on 2/22/24 and will end on 2/29/24.    3. Patient is on Perocet 5-325mg tablets, taking 1 tablet PO Q4H PRN for her pain, last taken yesterday. She was started on Norco and was given a dose today with the intention of it being a discharge med.    Idania Thomas, Pharmacy Intern  2/22/2024 7:47 PM

## 2024-02-23 NOTE — PROGRESS NOTES
4 Eyes Skin Assessment     NAME:  Stephanie Whitney  YOB: 1955  MEDICAL RECORD NUMBER:  [id number]    The patient is being assessed for  Admission    I agree that at least one RN has performed a thorough Head to Toe Skin Assessment on the patient. ALL assessment sites listed below have been assessed.      Areas assessed by both nurses:    Head, Face, Ears, Shoulders, Back, Chest, Arms, Elbows, Hands, Sacrum. Buttock, Coccyx, Ischium, Legs. Feet and Heels, and Under Medical Devices         Does the Patient have a Wound? yes     Shane Prevention initiated by RN: No  Wound Care Orders initiated by RN: No    Pressure Injury (Stage 3,4, Unstageable, DTI, NWPT, and Complex wounds) if present, place Wound referral order by RN under : No    New Ostomies, if present place, Ostomy referral order under : No     Nurse 1 eSignature: Electronically signed by Karolina Calix RN on 2/23/24 at 12:59 AM EST    **SHARE this note so that the co-signing nurse can place an eSignature**    Nurse 2 eSignature: Electronically signed by Lurdes Reynoso RN on 2/23/24 at 1:38 AM EST

## 2024-02-23 NOTE — DISCHARGE INSTRUCTIONS
1. Remove ACE wrap 2/24/2024 , Remove dressings 2/24/2024, leave steristrips in place  2. Ice pack to bilateral operative sites  3. Record drain output 2-3 times daily, Strip drain when emptying  4. Do not rinse bulb or tubing!  5. No driving x for few days    6. No lifting > 10 lbs with right arm while drain in place  7. Call office on 2- to report drain output, follow up will be scheduled at that time.  [phone]  8.  Call with problems or questions [phone]  9.  May shower beginning to 2/24/2024  10.  May cover drain site with gauze and tape as needed

## 2024-02-23 NOTE — PROGRESS NOTES
Data- discharge order received, pt verbalized agreement to discharge, disposition to previous residence, no needs for HHC/DME.     Action- discharge instructions prepared and given to pt. , pt verbalized understanding. Medication information packet given r/t NEW and/or CHANGED prescriptions emphasizing name/purpose/side effects, pt verbalized understanding. Discharge instruction summary: Diet- regular , Activity- up as tolerated , Primary Care Physician as follows: Gisele Mooney, [place] - [phone] f/u appointment in 1 week , immunizations reviewed and up to date , prescription medications none . Inpatient treatment reviewed.     Response- Pt belongings gathered, IV removed. Disposition is home (no HHC/DME needs), transported with father , taken to lobby via w/c w/ father , no complications.

## 2024-02-23 NOTE — CARE COORDINATION
Case Management assessment not required per guidelines.  Per bedside, RN patient able to manage SALOME drain; no home care orders at this time.    Electronically signed by FUNMILAYO Tyson, LISW, Case Management on 2/23/2024 at 8:58 AM  [place] [phone]

## 2024-02-23 NOTE — DISCHARGE SUMMARY
Physician Discharge Summary     Patient ID:  Stephanie Whitney  [id number]  68 y.o.  1955    Admit date: 2/22/2024    Discharge date and time: No discharge date for patient encounter.     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 Syncope s/p surgery  2. Post op hematoma    Admission Condition: fair    Discharged Condition: good    Indication for Admission: Patient admitted following episode of dizziness and postoperative swelling at surgical sites.     Hospital Course: Patient was admitted for fluid resuscitation following dizziness and postoperative bleeding.  She received 1 L bolus of normal saline in the emergency room and some IV fluids overnight.  She did receive a second bolus her blood pressure has returned to baseline in the low 90s.  She has no tachycardia and she notes her symptoms have fully resolved.    Consults: none    Significant Diagnostic Studies: None    Treatments: surgery: 1.  Right prophylactic mastectomy with removal of breast implant  2 removal of left ruptured left breast implant    Discharge Exam:  See notes    Disposition: Home    In process/preliminary results:  Outstanding Order Results       Date and Time Order Name Status Description    2/22/2024  8:48 PM Culture, Urine In process     2/22/2024  5:39 PM EKG 12 Lead Preliminary     2/22/2024  8:49 AM Surgical Pathology In process             Patient Instructions:   Current Discharge Medication List        CONTINUE these medications which have NOT CHANGED    Details   omeprazole (PRILOSEC) 10 MG delayed release capsule Take 2 capsules by mouth daily as needed (dyspepsia)      clotrimazole-betamethasone (LOTRISONE) 1-0.05 % cream Apply topically 2 times daily      melatonin 3 MG TABS tablet Take

## 2024-02-23 NOTE — PROGRESS NOTES
PT AAO x4. Pt tolerating PO fluids and small snack. IVF infusing, feet elevated. B/P at 90/42 hector beginning of shift. Pt was lightheaded when ambulating. MD Rodríguez notified with NNO. B/P 86/48 this AM. Pt asymptomatic when ambulating. MD Sosa notified with 500 CC NS. Pt Ace to chest CDI. Ice and tylenol used to control pain. SALOME drain stripped and education completed.No further needs voiced at this time. Fall precautions in place. Bed alarm on. Call light within reach. Will continue care. Electronically signed by Karolina Calix RN on 2/23/2024 at 6:46 AM

## 2024-02-23 NOTE — PROGRESS NOTES
Attending Surgeon    Much improved this morning  Previous symptoms resolved  No further nausea, tolerating diet  Pain controlled  Dizziness resolved    Vitals:    02/23/24 0430 02/23/24 0545 02/23/24 0556 02/23/24 0600   BP: (!) 86/42 (!) 86/40 (!) 88/40 (!) 86/48   Pulse:       Resp:       Temp:       TempSrc:       SpO2:       Weight:       Height:          SALOME drain right: 90 cc    Right mastectomy site: no collections, no swelling, no hematomas  Left mastectomy site:stable hematoma at site  Neuro: Awake alert oriented x 3  Cardiovascular: Regular rate and rhythm    Hemoglobin 10.1  Hematocrit 32.4    Assessment and plan: Postop day #1: Right simple mastectomy, removal left breast implant: Patient admitted for postoperative hematoma and hypotension.  She was found to initially have lost approximately 250 to 300 cc of blood.  There is no evidence of further bleeding.  I do think some of her symptoms were secondary to postoperative effects of anesthesia along with her oral pain medicine.  She is responded nicely to bolus of IV fluids.  There is no need for transfusion.  Her symptoms are markedly improved today.  Will plan to discharge home.  Instructed on dressing and drain care.  Follow-up as outpatient.  She already has prescriptions at home.    Adamaris Sosa MD  [phone]

## 2024-02-23 NOTE — ED NOTES
ED handoff report provided to JULIO CESAR Turcios. Patient to be transported to Room 3125 via stretcher. IV site clean, dry, and intact. Vitals stable. Patient updated on plan of care. All questions answered.

## 2024-02-26 NOTE — H&P
Returned to office s/p right mastectomy, removal bilateral breast implants.  C/o swelling at surgical sites, bleeding from wounds, dizziness and nausea.    Past Medical History:   Diagnosis Date    Cancer (HCC) 01/01/2000    breast cancer    Seasonal allergies     SVT (supraventricular tachycardia)      Past Surgical History:   Procedure Laterality Date    BILATERAL SALPINGOOPHORECTOMY      MASTECTOMY Left 01/01/2000    MASTECTOMY Bilateral 2/22/2024    RIGHT BREAST PROPHYLACTIC MASTECTOMY; REMOVAL LEFT BREAST IMPLANT performed by Adamaris Sosa MD at [facility] OR     No Known Allergies  Scheduled Meds:  Continuous Infusions:  PRN Meds:.  Vitals:    02/23/24 1119   BP: (!) 100/57   Pulse: 95   Resp: 20   Temp: 99.3 °F (37.4 °C)   SpO2: 95%     PE:   General: pale appearing female  Cv: RRR  Right mastectomy site: dressing bloody, no active bleeding from site, SALOME drain with 20cc bloody fluid, no active dranage  Left surgical site: mild swelling, mild tender, dressing bloody    A/P Post op bleeding, post op anesthesia issues: will admit for IV hydration.  H/H okay.      Adamaris Sosa MD

## 2024-02-27 NOTE — OP NOTE
induction of general anesthesia the patient's chest and axilla were prepped with Betadine and draped in the usual sterile manner.  The right mastectomy was performed first.  An elliptical incision was made to incorporate the nipple and areola within the incision.  Tenaculum clamps were placed along the margins of the incision.  Dissection was carried medially to the sternum superiorly to the clavicle laterally to the latissimus dorsi muscle and inferiorly to the superior boundary of the rectus muscle.  Flap thickness was Even throughout the dissection.  The breast was removed in a medial to lateral direction removing the underlying pectoralis fascia.  Upon removing the breast the post pectoral breast implant was easily identified.  The capsule along the inferior aspect of the pectoralis major muscle was opened.  Upon opening the capsule a ruptured breast implant was identified and removed.  A large amount of the capsule was removed.  The mastectomy cavity was copiously irrigated with saline solution.  A 10 mm Jasper drain was placed through separate stab incision in the anterior axillary line and anchored into place with a 2-0 silk suture.  The subcutaneous tissue was closed interrupted 3-0 Vicryl suture and the skin was closed with 4-0 Vicryl subcuticular stitch.  Benzoin Steri-Strips sterile gauze and Tegaderm dressing were applied.    An elliptical incision was made along the left mastectomy site.  The skin and the subcutaneous tissue was removed.  The capsule surrounding the implant was then identified.  It was opened along the inferior aspect of the pectoralis muscle.  Upon opening the capsule it was also evident that this implant was ruptured as well.  This implant was removed along with the ruptured silicone at the site.  A large portion of the capsule was also excised.    The flaps were then elevated superiorly and inferiorly to allow for closure of the mastectomy site.  Following irrigation the subcutaneous

## 2024-04-22 ENCOUNTER — OFFICE VISIT (OUTPATIENT)
Age: 69
End: 2024-04-22

## 2024-04-22 VITALS
BODY MASS INDEX: 20.25 KG/M2 | WEIGHT: 118 LBS | TEMPERATURE: 98.2 F | OXYGEN SATURATION: 97 % | HEART RATE: 68 BPM | SYSTOLIC BLOOD PRESSURE: 134 MMHG | DIASTOLIC BLOOD PRESSURE: 78 MMHG | RESPIRATION RATE: 16 BRPM

## 2024-04-22 DIAGNOSIS — L23.7 POISON IVY DERMATITIS: Primary | ICD-10-CM

## 2024-04-22 PROBLEM — U07.1 COVID: Status: ACTIVE | Noted: 2022-05-03

## 2024-04-22 RX ORDER — DEXAMETHASONE SODIUM PHOSPHATE 4 MG/ML
8 INJECTION, SOLUTION INTRA-ARTICULAR; INTRALESIONAL; INTRAMUSCULAR; INTRAVENOUS; SOFT TISSUE ONCE
Status: DISCONTINUED | OUTPATIENT
Start: 2024-04-22 | End: 2024-04-22

## 2024-04-22 RX ORDER — DEXAMETHASONE SODIUM PHOSPHATE 10 MG/ML
10 INJECTION, SOLUTION INTRAMUSCULAR; INTRAVENOUS ONCE
Status: CANCELLED | OUTPATIENT
Start: 2024-04-22 | End: 2024-04-22

## 2024-04-22 RX ORDER — HYDROXYZINE PAMOATE 25 MG/1
50 CAPSULE ORAL 3 TIMES DAILY PRN
Qty: 30 CAPSULE | Refills: 0 | Status: SHIPPED | OUTPATIENT
Start: 2024-04-22 | End: 2024-05-02

## 2024-04-22 RX ORDER — PREDNISONE 10 MG/1
TABLET ORAL
Qty: 42 TABLET | Refills: 0 | Status: SHIPPED | OUTPATIENT
Start: 2024-04-22

## 2024-04-22 RX ORDER — DEXAMETHASONE SODIUM PHOSPHATE 4 MG/ML
8 INJECTION, SOLUTION INTRA-ARTICULAR; INTRALESIONAL; INTRAMUSCULAR; INTRAVENOUS; SOFT TISSUE ONCE
Status: COMPLETED | OUTPATIENT
Start: 2024-04-22 | End: 2024-04-22

## 2024-04-22 RX ADMIN — DEXAMETHASONE SODIUM PHOSPHATE 8 MG: 4 INJECTION, SOLUTION INTRA-ARTICULAR; INTRALESIONAL; INTRAMUSCULAR; INTRAVENOUS; SOFT TISSUE at 09:40

## 2024-04-22 NOTE — PROGRESS NOTES
Stephanie Whitney (:  1955) is a 68 y.o. female, here for evaluation of the following chief complaint(s):  Rash      ASSESSMENT/PLAN:  Visit Diagnoses and Associated Orders       Poison ivy dermatitis    -  Primary    dexAMETHasone (DECADRON) injection 8 mg [[id number]]           ORDERS WITHOUT AN ASSOCIATED DIAGNOSIS    hydrOXYzine pamoate (VISTARIL) 25 MG capsule [3777]      predniSONE (DELTASONE) 10 MG tablet [[id number]]             Summary    - Patient's exam and complaint suggests that this rash is likely caused by the yard work that she was doing at which point she came into contact with poison ivy.    Differentials: Herpes Zoster, Varicella, Atopic Dermatitis, Tinea Corporis, Scabies or other insect/parasite, Contact Dermatitis.    SUBJECTIVE/OBJECTIVE:    Kirstin Brown presents to the clinic with a rash which onset 2 days ago. Hives are described as a red, raised, itchy, and spreading skin rash that occurs on the chest and arms. She admits to doing yard work the day before the rash appeared.    She denies discharge or pain.     She denies using new deodorant, shampoo, soap, detergent, cologne/perfume/body spray, clothes, medications, jewelry, lotions.    Vitals:    24 0912   BP: 134/78   Pulse: 68   Resp: 16   Temp: 98.2 °F (36.8 °C)   TempSrc: Oral   SpO2: 97%   Weight: 53.5 kg (118 lb)       No results found for this visit on 24.     No orders to display       Review of Systems   Skin:  Positive for rash.         Physical Exam  Vitals reviewed.   Constitutional:       Appearance: She is normal weight.   HENT:      Head: Normocephalic.      Mouth/Throat:      Mouth: Mucous membranes are moist.   Eyes:      Pupils: Pupils are equal, round, and reactive to light.   Pulmonary:      Effort: Pulmonary effort is normal.   Abdominal:      Tenderness: There is no guarding.   Musculoskeletal:      Cervical back: Neck supple.   Skin:     General: Skin is warm.      Findings: Rash present. Rash is

## (undated) DEVICE — SYRINGE MED 5ML STD CLR PLAS LUERLOCK TIP N CTRL DISP

## (undated) DEVICE — GAUZE,SPONGE,4"X4",16PLY,XRAY,STRL,LF: Brand: MEDLINE

## (undated) DEVICE — DRAPE,CHEST,FENES,15X10,STERIL: Brand: MEDLINE

## (undated) DEVICE — SUTURE PERMAHAND SZ 2-0 L18IN NONABSORBABLE BLK L26MM FS 685G

## (undated) DEVICE — RESERVOIR,SUCTION,100CC,SILICONE: Brand: MEDLINE

## (undated) DEVICE — GARMENT COMPR STD FOR 17IN CALF UNIF THER FLOTRN

## (undated) DEVICE — SOLUTION SCRB 4OZ 7.5% POVIDONE IOD ANTIMIC BTL

## (undated) DEVICE — SOLUTION PREP PAINT POV IOD FOR SKIN MUCOUS MEM

## (undated) DEVICE — SPONGE LAP W18XL18IN WHT COT 4 PLY FLD STRUNG RADPQ DISP ST 2 PER PACK

## (undated) DEVICE — STRIP,CLOSURE,WOUND,MEDI-STRIP,1/2X4: Brand: MEDLINE

## (undated) DEVICE — GOWN SIRUS NONREIN XL W/TWL: Brand: MEDLINE INDUSTRIES, INC.

## (undated) DEVICE — DRAIN SURG 7FR END PERF 1/8IN SIL RND SMOOTH HEAT POLISHED

## (undated) DEVICE — 3M(TM) MEDIPORE(TM) H SOFT CLOTH TAPE 2866: Brand: 3M™ MEDIPORE™

## (undated) DEVICE — SYRINGE MED 10ML LUERLOCK TIP W/O SFTY DISP

## (undated) DEVICE — GLOVE,SURG,SENSICARE SLT,LF,PF,7: Brand: MEDLINE

## (undated) DEVICE — SUTURE VCRL + SZ 3-0 L27IN ABSRB UD L26MM SH 1/2 CIR VCP416H

## (undated) DEVICE — BLANKET WRM W40.2XL55.9IN IORT LO BODY + MISTRAL AIR

## (undated) DEVICE — SPONGE,LAP,4"X18",XR,ST,5/PK,40PK/CS: Brand: MEDLINE INDUSTRIES, INC.

## (undated) DEVICE — CLEANER,CAUTERY TIP,2X2",STERILE: Brand: MEDLINE

## (undated) DEVICE — MAJOR SET UP: Brand: MEDLINE INDUSTRIES, INC.

## (undated) DEVICE — MASTISOL ADHESIVE LIQ 2/3ML

## (undated) DEVICE — COVER LT HNDL BLU PLAS

## (undated) DEVICE — INTENDED FOR TISSUE SEPARATION, AND OTHER PROCEDURES THAT REQUIRE A SHARP SURGICAL BLADE TO PUNCTURE OR CUT.: Brand: BARD-PARKER ® STAINLESS STEEL BLADES

## (undated) DEVICE — PREMIUM DRY TRAY LF: Brand: MEDLINE INDUSTRIES, INC.

## (undated) DEVICE — SUTURE VCRL + SZ 4-0 L18IN ABSRB UD L19MM PS-2 3/8 CIR PRIM VCP496H

## (undated) DEVICE — NEEDLE 25GA X 1.5 IN ECLIPSE